# Patient Record
Sex: FEMALE | Race: WHITE | NOT HISPANIC OR LATINO
[De-identification: names, ages, dates, MRNs, and addresses within clinical notes are randomized per-mention and may not be internally consistent; named-entity substitution may affect disease eponyms.]

---

## 2019-01-09 ENCOUNTER — TRANSCRIPTION ENCOUNTER (OUTPATIENT)
Age: 59
End: 2019-01-09

## 2019-01-24 ENCOUNTER — TRANSCRIPTION ENCOUNTER (OUTPATIENT)
Age: 59
End: 2019-01-24

## 2019-01-24 ENCOUNTER — NON-APPOINTMENT (OUTPATIENT)
Age: 59
End: 2019-01-24

## 2019-01-24 ENCOUNTER — APPOINTMENT (OUTPATIENT)
Dept: INTERNAL MEDICINE | Facility: CLINIC | Age: 59
End: 2019-01-24
Payer: COMMERCIAL

## 2019-01-24 VITALS
HEIGHT: 68.75 IN | DIASTOLIC BLOOD PRESSURE: 90 MMHG | TEMPERATURE: 97.9 F | HEART RATE: 71 BPM | SYSTOLIC BLOOD PRESSURE: 140 MMHG | WEIGHT: 193 LBS | BODY MASS INDEX: 28.58 KG/M2 | OXYGEN SATURATION: 98 %

## 2019-01-24 DIAGNOSIS — L98.9 DISORDER OF THE SKIN AND SUBCUTANEOUS TISSUE, UNSPECIFIED: ICD-10-CM

## 2019-01-24 DIAGNOSIS — Z82.3 FAMILY HISTORY OF STROKE: ICD-10-CM

## 2019-01-24 DIAGNOSIS — Z78.9 OTHER SPECIFIED HEALTH STATUS: ICD-10-CM

## 2019-01-24 DIAGNOSIS — Z82.49 FAMILY HISTORY OF ISCHEMIC HEART DISEASE AND OTHER DISEASES OF THE CIRCULATORY SYSTEM: ICD-10-CM

## 2019-01-24 PROCEDURE — 93000 ELECTROCARDIOGRAM COMPLETE: CPT

## 2019-01-24 PROCEDURE — 99204 OFFICE O/P NEW MOD 45 MIN: CPT | Mod: 25

## 2019-01-24 PROCEDURE — 99386 PREV VISIT NEW AGE 40-64: CPT | Mod: 25

## 2019-01-24 NOTE — HEALTH RISK ASSESSMENT
[Good] : ~his/her~  mood as  good [Intercurrent Urgi Care visits] : went to urgent care [No falls in past year] : Patient reported no falls in the past year [0] : 2) Feeling down, depressed, or hopeless: Not at all (0) [With Family] : lives with family [# of Members in Household ___] :  household currently consist of [unfilled] member(s) [Employed] : employed [Graduate School] : graduate school [Single] : single [Feels Safe at Home] : Feels safe at home [Fully functional (bathing, dressing, toileting, transferring, walking, feeding)] : Fully functional (bathing, dressing, toileting, transferring, walking, feeding) [Fully functional (using the telephone, shopping, preparing meals, housekeeping, doing laundry, using] : Fully functional and needs no help or supervision to perform IADLs (using the telephone, shopping, preparing meals, housekeeping, doing laundry, using transportation, managing medications and managing finances) [Reports changes in vision] : Reports changes in vision [Smoke Detector] : smoke detector [Carbon Monoxide Detector] : carbon monoxide detector [Seat Belt] :  uses seat belt [Sunscreen] : uses sunscreen [Designated Healthcare Proxy] : Designated healthcare proxy [Name: ___] : Health Care Proxy's Name: [unfilled]  [Relationship: ___] : Relationship: [unfilled] [] : No [de-identified] : rarely [Change in mental status noted] : No change in mental status noted [Language] : denies difficulty with language [Behavior] : denies difficulty with behavior [Handling Complex Tasks] : denies difficulty handling complex tasks [Reasoning] : denies difficulty with reasoning [Reports changes in hearing] : Reports no changes in hearing [Reports changes in dental health] : Reports no changes in dental health [MammogramDate] : several years [PapSmearDate] : many years [BoneDensityDate] : never [ColonoscopyDate] : 8 years ago  [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

## 2019-01-24 NOTE — PHYSICAL EXAM
[No Acute Distress] : no acute distress [Well Nourished] : well nourished [Well Developed] : well developed [Well-Appearing] : well-appearing [Normal Sclera/Conjunctiva] : normal sclera/conjunctiva [PERRL] : pupils equal round and reactive to light [EOMI] : extraocular movements intact [Normal Outer Ear/Nose] : the outer ears and nose were normal in appearance [Normal Oropharynx] : the oropharynx was normal [Normal TMs] : both tympanic membranes were normal [No JVD] : no jugular venous distention [Supple] : supple [No Lymphadenopathy] : no lymphadenopathy [Thyroid Normal, No Nodules] : the thyroid was normal and there were no nodules present [No Respiratory Distress] : no respiratory distress  [Clear to Auscultation] : lungs were clear to auscultation bilaterally [No Accessory Muscle Use] : no accessory muscle use [Normal Rate] : normal rate  [Regular Rhythm] : with a regular rhythm [Normal S1, S2] : normal S1 and S2 [No Murmur] : no murmur heard [No Carotid Bruits] : no carotid bruits [No Abdominal Bruit] : a ~M bruit was not heard ~T in the abdomen [No Varicosities] : no varicosities [Pedal Pulses Present] : the pedal pulses are present [No Edema] : there was no peripheral edema [No Extremity Clubbing/Cyanosis] : no extremity clubbing/cyanosis [No Palpable Aorta] : no palpable aorta [Normal Appearance] : normal in appearance [No Nipple Discharge] : no nipple discharge [No Axillary Lymphadenopathy] : no axillary lymphadenopathy [Soft] : abdomen soft [Non Tender] : non-tender [Non-distended] : non-distended [No Masses] : no abdominal mass palpated [No HSM] : no HSM [Normal Bowel Sounds] : normal bowel sounds [Normal Sphincter Tone] : normal sphincter tone [No Mass] : no mass [Normal Posterior Cervical Nodes] : no posterior cervical lymphadenopathy [Normal Anterior Cervical Nodes] : no anterior cervical lymphadenopathy [No Joint Swelling] : no joint swelling [Grossly Normal Strength/Tone] : grossly normal strength/tone [No Rash] : no rash [Normal Gait] : normal gait [Coordination Grossly Intact] : coordination grossly intact [No Focal Deficits] : no focal deficits [Normal Affect] : the affect was normal [Normal Insight/Judgement] : insight and judgment were intact [Stool Occult Blood] : stool negative for occult blood

## 2019-01-24 NOTE — HISTORY OF PRESENT ILLNESS
[FreeTextEntry1] : The patient is a 58 year year old female who presents for annual physical examination.\par  [de-identified] : The patient is a 58-year-old female with no significant past medical history presents for routine physical exam and to establish care. Patient reports that she had been seeing a dermatologist in [place] for a lesion on her right cheek which was never fully removed. She is requesting the name of a dermatologist for further management. She has otherwise been feeling well. She denies any symptoms of chest pain, shortness of breath, palpitations, dizziness, lightheadedness, or syncope. Patient's appetite is good and there have been no symptoms of nausea, vomiting, change in bowels, bright red blood per rectum, or melena.  She denies urinary frequency, urgency, dysuria, or hematuria. She admits that she has not been exercising as regularly in recent months.\par

## 2019-01-24 NOTE — ASSESSMENT
[FreeTextEntry1] : Healthcare maintenance: Patient is overdue for mammogram and bone density and was provided with a prescription for both of these. She was also advised to schedule appointment with gastroenterologist for screening colonoscopy. Patient also provided with referral to Gynecologist for routine exam.

## 2019-02-04 ENCOUNTER — LABORATORY RESULT (OUTPATIENT)
Age: 59
End: 2019-02-04

## 2019-02-05 ENCOUNTER — FORM ENCOUNTER (OUTPATIENT)
Age: 59
End: 2019-02-05

## 2019-02-06 ENCOUNTER — OUTPATIENT (OUTPATIENT)
Dept: OUTPATIENT SERVICES | Facility: HOSPITAL | Age: 59
LOS: 1 days | End: 2019-02-06
Payer: COMMERCIAL

## 2019-02-06 ENCOUNTER — APPOINTMENT (OUTPATIENT)
Dept: RADIOLOGY | Facility: CLINIC | Age: 59
End: 2019-02-06
Payer: COMMERCIAL

## 2019-02-06 ENCOUNTER — APPOINTMENT (OUTPATIENT)
Dept: MAMMOGRAPHY | Facility: CLINIC | Age: 59
End: 2019-02-06
Payer: COMMERCIAL

## 2019-02-06 DIAGNOSIS — Z12.31 ENCOUNTER FOR SCREENING MAMMOGRAM FOR MALIGNANT NEOPLASM OF BREAST: ICD-10-CM

## 2019-02-06 PROCEDURE — 77080 DXA BONE DENSITY AXIAL: CPT | Mod: 26

## 2019-02-06 PROCEDURE — 77067 SCR MAMMO BI INCL CAD: CPT | Mod: 26

## 2019-02-06 PROCEDURE — 77067 SCR MAMMO BI INCL CAD: CPT

## 2019-02-06 PROCEDURE — 77080 DXA BONE DENSITY AXIAL: CPT

## 2019-02-06 PROCEDURE — 77063 BREAST TOMOSYNTHESIS BI: CPT | Mod: 26

## 2019-02-06 PROCEDURE — 77063 BREAST TOMOSYNTHESIS BI: CPT

## 2019-02-07 ENCOUNTER — LABORATORY RESULT (OUTPATIENT)
Age: 59
End: 2019-02-07

## 2019-02-09 ENCOUNTER — LABORATORY RESULT (OUTPATIENT)
Age: 59
End: 2019-02-09

## 2019-02-13 ENCOUNTER — TRANSCRIPTION ENCOUNTER (OUTPATIENT)
Age: 59
End: 2019-02-13

## 2019-02-13 ENCOUNTER — APPOINTMENT (OUTPATIENT)
Dept: DERMATOLOGY | Facility: CLINIC | Age: 59
End: 2019-02-13

## 2019-02-14 LAB
APPEARANCE: ABNORMAL
BILIRUBIN URINE: NEGATIVE
BLOOD URINE: ABNORMAL
COLOR: YELLOW
GLUCOSE QUALITATIVE U: NEGATIVE MG/DL
KETONES URINE: NEGATIVE
LEUKOCYTE ESTERASE URINE: NEGATIVE
NITRITE URINE: NEGATIVE
PH URINE: 5.5
PROTEIN URINE: ABNORMAL MG/DL
SPECIFIC GRAVITY URINE: 1.03
UROBILINOGEN URINE: NEGATIVE MG/DL

## 2019-02-19 ENCOUNTER — FORM ENCOUNTER (OUTPATIENT)
Age: 59
End: 2019-02-19

## 2019-02-20 ENCOUNTER — OUTPATIENT (OUTPATIENT)
Dept: OUTPATIENT SERVICES | Facility: HOSPITAL | Age: 59
LOS: 1 days | End: 2019-02-20
Payer: COMMERCIAL

## 2019-02-20 ENCOUNTER — APPOINTMENT (OUTPATIENT)
Dept: ULTRASOUND IMAGING | Facility: CLINIC | Age: 59
End: 2019-02-20
Payer: COMMERCIAL

## 2019-02-20 DIAGNOSIS — Z00.8 ENCOUNTER FOR OTHER GENERAL EXAMINATION: ICD-10-CM

## 2019-02-20 PROCEDURE — 76770 US EXAM ABDO BACK WALL COMP: CPT | Mod: 26

## 2019-02-20 PROCEDURE — 76770 US EXAM ABDO BACK WALL COMP: CPT

## 2019-02-22 ENCOUNTER — APPOINTMENT (OUTPATIENT)
Dept: DERMATOLOGY | Facility: CLINIC | Age: 59
End: 2019-02-22
Payer: COMMERCIAL

## 2019-02-22 DIAGNOSIS — L81.4 OTHER MELANIN HYPERPIGMENTATION: ICD-10-CM

## 2019-02-22 DIAGNOSIS — L72.0 EPIDERMAL CYST: ICD-10-CM

## 2019-02-22 DIAGNOSIS — H54.7 UNSPECIFIED VISUAL LOSS: ICD-10-CM

## 2019-02-22 DIAGNOSIS — Z91.89 OTHER SPECIFIED PERSONAL RISK FACTORS, NOT ELSEWHERE CLASSIFIED: ICD-10-CM

## 2019-02-22 DIAGNOSIS — Z87.2 PERSONAL HISTORY OF DISEASES OF THE SKIN AND SUBCUTANEOUS TISSUE: ICD-10-CM

## 2019-02-22 DIAGNOSIS — Z84.0 FAMILY HISTORY OF DISEASES OF THE SKIN AND SUBCUTANEOUS TISSUE: ICD-10-CM

## 2019-02-22 PROCEDURE — 99202 OFFICE O/P NEW SF 15 MIN: CPT

## 2019-03-01 ENCOUNTER — TRANSCRIPTION ENCOUNTER (OUTPATIENT)
Age: 59
End: 2019-03-01

## 2019-04-11 ENCOUNTER — FORM ENCOUNTER (OUTPATIENT)
Age: 59
End: 2019-04-11

## 2019-04-12 ENCOUNTER — APPOINTMENT (OUTPATIENT)
Dept: MAMMOGRAPHY | Facility: CLINIC | Age: 59
End: 2019-04-12
Payer: COMMERCIAL

## 2019-04-12 ENCOUNTER — OUTPATIENT (OUTPATIENT)
Dept: OUTPATIENT SERVICES | Facility: HOSPITAL | Age: 59
LOS: 1 days | End: 2019-04-12
Payer: COMMERCIAL

## 2019-04-12 DIAGNOSIS — Z00.8 ENCOUNTER FOR OTHER GENERAL EXAMINATION: ICD-10-CM

## 2019-04-12 PROCEDURE — 77065 DX MAMMO INCL CAD UNI: CPT | Mod: 26,LT

## 2019-04-12 PROCEDURE — 77065 DX MAMMO INCL CAD UNI: CPT

## 2019-04-28 ENCOUNTER — TRANSCRIPTION ENCOUNTER (OUTPATIENT)
Age: 59
End: 2019-04-28

## 2019-11-13 ENCOUNTER — APPOINTMENT (OUTPATIENT)
Dept: MAMMOGRAPHY | Facility: CLINIC | Age: 59
End: 2019-11-13

## 2019-12-18 ENCOUNTER — FORM ENCOUNTER (OUTPATIENT)
Age: 59
End: 2019-12-18

## 2019-12-19 ENCOUNTER — OUTPATIENT (OUTPATIENT)
Dept: OUTPATIENT SERVICES | Facility: HOSPITAL | Age: 59
LOS: 1 days | End: 2019-12-19
Payer: COMMERCIAL

## 2019-12-19 ENCOUNTER — APPOINTMENT (OUTPATIENT)
Dept: MAMMOGRAPHY | Facility: CLINIC | Age: 59
End: 2019-12-19
Payer: COMMERCIAL

## 2019-12-19 DIAGNOSIS — Z00.8 ENCOUNTER FOR OTHER GENERAL EXAMINATION: ICD-10-CM

## 2019-12-19 PROCEDURE — 77065 DX MAMMO INCL CAD UNI: CPT

## 2019-12-19 PROCEDURE — 77065 DX MAMMO INCL CAD UNI: CPT | Mod: 26,LT

## 2019-12-26 ENCOUNTER — APPOINTMENT (OUTPATIENT)
Dept: INTERNAL MEDICINE | Facility: CLINIC | Age: 59
End: 2019-12-26
Payer: COMMERCIAL

## 2019-12-26 ENCOUNTER — LABORATORY RESULT (OUTPATIENT)
Age: 59
End: 2019-12-26

## 2019-12-26 VITALS
SYSTOLIC BLOOD PRESSURE: 140 MMHG | TEMPERATURE: 98.3 F | WEIGHT: 193 LBS | HEART RATE: 68 BPM | OXYGEN SATURATION: 98 % | BODY MASS INDEX: 29.25 KG/M2 | DIASTOLIC BLOOD PRESSURE: 90 MMHG | HEIGHT: 68 IN

## 2019-12-26 PROCEDURE — G0008: CPT

## 2019-12-26 PROCEDURE — 99213 OFFICE O/P EST LOW 20 MIN: CPT | Mod: 25

## 2019-12-26 PROCEDURE — 90686 IIV4 VACC NO PRSV 0.5 ML IM: CPT

## 2019-12-26 NOTE — PHYSICAL EXAM
[Normal Oropharynx] : the oropharynx was normal [Normal TMs] : both tympanic membranes were normal [Normal Nasal Mucosa] : the nasal mucosa was normal [No Lymphadenopathy] : no lymphadenopathy [Supple] : supple [No Edema] : there was no peripheral edema [Normal] : soft, non-tender, non-distended, no masses palpated, no HSM and normal bowel sounds [Normal Posterior Cervical Nodes] : no posterior cervical lymphadenopathy [Normal Anterior Cervical Nodes] : no anterior cervical lymphadenopathy [No Rash] : no rash [No Focal Deficits] : no focal deficits [Normal Affect] : the affect was normal [Alert and Oriented x3] : oriented to person, place, and time [Normal Insight/Judgement] : insight and judgment were intact

## 2019-12-26 NOTE — HISTORY OF PRESENT ILLNESS
[FreeTextEntry1] : The patient is a 59-year-old female who presents for follow-up. [de-identified] : The patient is a 59-year-old female with no significant past medical history presents for follow-up and evaluation of recurrent epistaxis.  Patient reports that she developed a bloody nose on Thanksgiving.  Since it was prolonged she presented to [facility] for evaluation.  Bleeding eventually stopped and she was prescribed Afrin to be used as needed.  Since that episode she has had 2 more episodes.  The last one being 4 days ago.  Denies recent trauma or headaches.  She has not had increased bruising, gum bleeding, hematuria, or bright red blood per rectum.  She admits that blood pressure upon presentation to the emergency department on Thanksgiving was significantly elevated.  However when she was discharged blood pressure was reasonably well controlled.  She does not measure blood pressure readings at home.  Patient was referred to ENT by emergency medicine physician however has not yet scheduled an appointment.  Patient denies using over-the-counter NSAIDs or aspirin on a regular basis.

## 2020-01-13 ENCOUNTER — TRANSCRIPTION ENCOUNTER (OUTPATIENT)
Age: 60
End: 2020-01-13

## 2020-01-17 ENCOUNTER — LABORATORY RESULT (OUTPATIENT)
Age: 60
End: 2020-01-17

## 2020-01-20 LAB
25(OH)D3 SERPL-MCNC: 36.9 NG/ML
ALBUMIN SERPL ELPH-MCNC: 4.5 G/DL
ALP BLD-CCNC: 108 U/L
ALT SERPL-CCNC: 16 U/L
ANION GAP SERPL CALC-SCNC: 12 MMOL/L
APPEARANCE: CLEAR
AST SERPL-CCNC: 20 U/L
BASOPHILS # BLD AUTO: 0.06 K/UL
BASOPHILS NFR BLD AUTO: 0.9 %
BILIRUB SERPL-MCNC: 0.5 MG/DL
BILIRUBIN URINE: NEGATIVE
BLOOD URINE: ABNORMAL
BUN SERPL-MCNC: 14 MG/DL
CALCIUM SERPL-MCNC: 9.2 MG/DL
CHLORIDE SERPL-SCNC: 105 MMOL/L
CHOLEST SERPL-MCNC: 135 MG/DL
CHOLEST/HDLC SERPL: 4.4 RATIO
CO2 SERPL-SCNC: 24 MMOL/L
COLOR: YELLOW
CREAT SERPL-MCNC: 0.78 MG/DL
EOSINOPHIL # BLD AUTO: 0.24 K/UL
EOSINOPHIL NFR BLD AUTO: 3.6 %
ESTIMATED AVERAGE GLUCOSE: 108 MG/DL
FOLATE SERPL-MCNC: 9.9 NG/ML
GLUCOSE QUALITATIVE U: NEGATIVE
GLUCOSE SERPL-MCNC: 108 MG/DL
HBA1C MFR BLD HPLC: 5.4 %
HCT VFR BLD CALC: 35.8 %
HDLC SERPL-MCNC: 30 MG/DL
HGB BLD-MCNC: 10.6 G/DL
IMM GRANULOCYTES NFR BLD AUTO: 0.9 %
KETONES URINE: NEGATIVE
LDLC SERPL CALC-MCNC: 82 MG/DL
LEUKOCYTE ESTERASE URINE: ABNORMAL
LYMPHOCYTES # BLD AUTO: 2.11 K/UL
LYMPHOCYTES NFR BLD AUTO: 31.4 %
MAN DIFF?: NORMAL
MCHC RBC-ENTMCNC: 19.1 PG
MCHC RBC-ENTMCNC: 29.6 GM/DL
MCV RBC AUTO: 64.6 FL
MONOCYTES # BLD AUTO: 0.49 K/UL
MONOCYTES NFR BLD AUTO: 7.3 %
NEUTROPHILS # BLD AUTO: 3.76 K/UL
NEUTROPHILS NFR BLD AUTO: 55.9 %
NITRITE URINE: NEGATIVE
PH URINE: 6
PLATELET # BLD AUTO: 285 K/UL
POTASSIUM SERPL-SCNC: 4.4 MMOL/L
PROT SERPL-MCNC: 6.7 G/DL
PROTEIN URINE: NORMAL
RBC # BLD: 5.54 M/UL
RBC # FLD: 15.9 %
SODIUM SERPL-SCNC: 140 MMOL/L
SPECIFIC GRAVITY URINE: 1.02
TRIGL SERPL-MCNC: 112 MG/DL
TSH SERPL-ACNC: 2.1 UIU/ML
UROBILINOGEN URINE: NORMAL
VIT B12 SERPL-MCNC: 439 PG/ML
WBC # FLD AUTO: 6.72 K/UL

## 2020-01-23 LAB
FERRITIN SERPL-MCNC: 223 NG/ML
IRON SATN MFR SERPL: 28 %
IRON SERPL-MCNC: 85 UG/DL
TIBC SERPL-MCNC: 302 UG/DL
UIBC SERPL-MCNC: 217 UG/DL

## 2020-01-30 ENCOUNTER — NON-APPOINTMENT (OUTPATIENT)
Age: 60
End: 2020-01-30

## 2020-01-30 ENCOUNTER — APPOINTMENT (OUTPATIENT)
Dept: INTERNAL MEDICINE | Facility: CLINIC | Age: 60
End: 2020-01-30
Payer: COMMERCIAL

## 2020-01-30 VITALS
BODY MASS INDEX: 28.95 KG/M2 | TEMPERATURE: 99.3 F | HEART RATE: 85 BPM | DIASTOLIC BLOOD PRESSURE: 90 MMHG | OXYGEN SATURATION: 98 % | WEIGHT: 191 LBS | HEIGHT: 68 IN | SYSTOLIC BLOOD PRESSURE: 168 MMHG

## 2020-01-30 VITALS — DIASTOLIC BLOOD PRESSURE: 82 MMHG | SYSTOLIC BLOOD PRESSURE: 144 MMHG

## 2020-01-30 DIAGNOSIS — D64.9 ANEMIA, UNSPECIFIED: ICD-10-CM

## 2020-01-30 DIAGNOSIS — Z87.898 PERSONAL HISTORY OF OTHER SPECIFIED CONDITIONS: ICD-10-CM

## 2020-01-30 DIAGNOSIS — R31.29 OTHER MICROSCOPIC HEMATURIA: ICD-10-CM

## 2020-01-30 DIAGNOSIS — R03.0 ELEVATED BLOOD-PRESSURE READING, W/OUT DIAGNOSIS OF HYPERTENSION: ICD-10-CM

## 2020-01-30 DIAGNOSIS — Z00.00 ENCOUNTER FOR GENERAL ADULT MEDICAL EXAMINATION W/OUT ABNORMAL FINDINGS: ICD-10-CM

## 2020-01-30 PROCEDURE — 93000 ELECTROCARDIOGRAM COMPLETE: CPT | Mod: 59

## 2020-01-30 PROCEDURE — G0444 DEPRESSION SCREEN ANNUAL: CPT

## 2020-01-30 PROCEDURE — 99396 PREV VISIT EST AGE 40-64: CPT | Mod: 25

## 2020-01-30 NOTE — HISTORY OF PRESENT ILLNESS
[FreeTextEntry1] : The patient is a 59 year  old female who presents for annual physical examination.\par  [de-identified] : The patient is a 59-year-old female with past medical history significant for borderline hypertension presents for routine physical exam.  Patient states that she developed a recent URI with recent travel to [place].  She continues to have a residual cough but denies fever, chills, body aches, nausea, or vomiting.  She has been taking over-the-counter medications including Coricidin with some relief. She denies any symptoms of chest pain, shortness of breath, palpitations, dizziness, lightheadedness, or syncope. Patient's appetite is good and there have been no symptoms of nausea, vomiting, change in bowels, bright red blood per rectum, or melena.  She denies urinary frequency, urgency, dysuria, or hematuria.  Patient has been trying to increase exercise routine in an attempt to lose weight.  She also admits that she has been periodically checking her blood pressure readings at a local pharmacy and blood pressure readings have remained in the 140s systolic range.\par

## 2020-01-30 NOTE — PHYSICAL EXAM
[No Acute Distress] : no acute distress [Well Nourished] : well nourished [Well Developed] : well developed [Well-Appearing] : well-appearing [Normal Sclera/Conjunctiva] : normal sclera/conjunctiva [PERRL] : pupils equal round and reactive to light [EOMI] : extraocular movements intact [Normal Outer Ear/Nose] : the outer ears and nose were normal in appearance [Normal Oropharynx] : the oropharynx was normal [Normal TMs] : both tympanic membranes were normal [No JVD] : no jugular venous distention [No Lymphadenopathy] : no lymphadenopathy [Supple] : supple [Thyroid Normal, No Nodules] : the thyroid was normal and there were no nodules present [No Respiratory Distress] : no respiratory distress  [No Accessory Muscle Use] : no accessory muscle use [Clear to Auscultation] : lungs were clear to auscultation bilaterally [Normal Rate] : normal rate  [Regular Rhythm] : with a regular rhythm [Normal S1, S2] : normal S1 and S2 [No Murmur] : no murmur heard [No Carotid Bruits] : no carotid bruits [No Varicosities] : no varicosities [Pedal Pulses Present] : the pedal pulses are present [No Edema] : there was no peripheral edema [No Extremity Clubbing/Cyanosis] : no extremity clubbing/cyanosis [Normal Appearance] : normal in appearance [No Nipple Discharge] : no nipple discharge [No Axillary Lymphadenopathy] : no axillary lymphadenopathy [Soft] : abdomen soft [Non Tender] : non-tender [Non-distended] : non-distended [No Masses] : no abdominal mass palpated [No HSM] : no HSM [Normal Bowel Sounds] : normal bowel sounds [Normal Sphincter Tone] : normal sphincter tone [No Mass] : no mass [Normal Posterior Cervical Nodes] : no posterior cervical lymphadenopathy [Normal Anterior Cervical Nodes] : no anterior cervical lymphadenopathy [No Joint Swelling] : no joint swelling [Grossly Normal Strength/Tone] : grossly normal strength/tone [No Rash] : no rash [Coordination Grossly Intact] : coordination grossly intact [No Focal Deficits] : no focal deficits [Normal Gait] : normal gait [Normal Affect] : the affect was normal [Alert and Oriented x3] : oriented to person, place, and time [Normal Insight/Judgement] : insight and judgment were intact [Stool Occult Blood] : stool negative for occult blood

## 2020-01-30 NOTE — HEALTH RISK ASSESSMENT
[Good] : ~his/her~  mood as  good [No falls in past year] : Patient reported no falls in the past year [No] : No [0] : 1) Little interest or pleasure doing things: Not at all (0) [(PHQ-2) Unable to screen] : PHQ-2: unable to screen [Single] : single [Employed] : employed [Graduate School] : graduate school [Feels Safe at Home] : Feels safe at home [Fully functional (bathing, dressing, toileting, transferring, walking, feeding)] : Fully functional (bathing, dressing, toileting, transferring, walking, feeding) [Fully functional (using the telephone, shopping, preparing meals, housekeeping, doing laundry, using] : Fully functional and needs no help or supervision to perform IADLs (using the telephone, shopping, preparing meals, housekeeping, doing laundry, using transportation, managing medications and managing finances) [Carbon Monoxide Detector] : carbon monoxide detector [Smoke Detector] : smoke detector [Sunscreen] : uses sunscreen [Seat Belt] :  uses seat belt [Name: ___] : Health Care Proxy's Name: [unfilled]  [Designated Healthcare Proxy] : Designated healthcare proxy [Relationship: ___] : Relationship: [unfilled] [] : No [de-identified] : dermatology-  Dr. Miranda   Gynecology-  [de-identified] : walks regularly [Change in mental status noted] : No change in mental status noted [Language] : denies difficulty with language [Behavior] : denies difficulty with behavior [Handling Complex Tasks] : denies difficulty handling complex tasks [Reasoning] : denies difficulty with reasoning [Reports changes in hearing] : Reports no changes in hearing [Reports changes in vision] : Reports no changes in vision [MammogramDate] : 02/19 [PapSmearDate] : many years [BoneDensityDate] : 02/19 [ColonoscopyDate] : 2015 [de-identified] : wears glasses

## 2020-02-19 ENCOUNTER — APPOINTMENT (OUTPATIENT)
Dept: OTOLARYNGOLOGY | Facility: CLINIC | Age: 60
End: 2020-02-19
Payer: COMMERCIAL

## 2020-02-19 VITALS
HEART RATE: 64 BPM | HEIGHT: 68 IN | SYSTOLIC BLOOD PRESSURE: 139 MMHG | BODY MASS INDEX: 28.79 KG/M2 | DIASTOLIC BLOOD PRESSURE: 86 MMHG | WEIGHT: 190 LBS

## 2020-02-19 PROCEDURE — 31231 NASAL ENDOSCOPY DX: CPT

## 2020-02-19 PROCEDURE — 99242 OFF/OP CONSLTJ NEW/EST SF 20: CPT | Mod: 25

## 2020-02-19 NOTE — HISTORY OF PRESENT ILLNESS
[de-identified] : 59F presents for evaluation of nosebleeds. Referred by Dr. Josee Poole (PCP). Denies history of sinonasal issues, nosebleeds. The day of thanksgiving she suffered a right-sided nosebleed that she was unable to stop on her own-- presented to [facility] ED where they stopped it with pressure and afrin. Since then, has had several minor episodes, usually bilateral in nature. All have stopped with pressure alone. Reports mild nasal congestion, facial pain/pressure which is sensitive to changes in the weather.. Discolored rhinorrhea when sick-- most recently in January.  Infrequent tho-- approximately 1x/day. Fairly good sense of smell.\par \par SNOT-22: 9\par PMH: borderline htn

## 2020-02-19 NOTE — CONSULT LETTER
[Dear  ___] : Dear  [unfilled], [Consult Letter:] : I had the pleasure of evaluating your patient, [unfilled]. [Please see my note below.] : Please see my note below. [Consult Closing:] : Thank you very much for allowing me to participate in the care of this patient.  If you have any questions, please do not hesitate to contact me. [Sincerely,] : Sincerely, [FreeTextEntry3] : Donny Nicholas MD\par Department of Otolaryngology- Head & Neck Surgery\par [facility]\par [facility]\par \par Phone: [phone]\par Fax: [phone]\par

## 2020-03-05 ENCOUNTER — APPOINTMENT (OUTPATIENT)
Dept: INTERNAL MEDICINE | Facility: CLINIC | Age: 60
End: 2020-03-05
Payer: COMMERCIAL

## 2020-03-05 VITALS
HEIGHT: 68 IN | OXYGEN SATURATION: 97 % | SYSTOLIC BLOOD PRESSURE: 160 MMHG | BODY MASS INDEX: 28.95 KG/M2 | DIASTOLIC BLOOD PRESSURE: 90 MMHG | HEART RATE: 71 BPM | TEMPERATURE: 97.7 F | WEIGHT: 191 LBS

## 2020-03-05 VITALS — SYSTOLIC BLOOD PRESSURE: 142 MMHG | DIASTOLIC BLOOD PRESSURE: 90 MMHG

## 2020-03-05 PROCEDURE — 99213 OFFICE O/P EST LOW 20 MIN: CPT

## 2020-03-05 NOTE — HISTORY OF PRESENT ILLNESS
[FreeTextEntry1] : The patient is a 59-year-old female who presents for blood pressure check. [de-identified] : The patient is a 59-year-old female with past medical history significant for hypertension, recurrent epistaxis, presents for blood pressure check.  She was started on amlodipine 2.5 mg at her last visit tolerating the medication well.  Patient admits that she has been exercising more regularly and walks 2-1/2 to 5 miles daily.  She has not been monitoring her blood pressure readings at home but has remained compliant with low-sodium diet.  Patient reports that she had recurrent nosebleed approximately 1 week ago.  She was seen by ENT on February 19, 2020.  She did not require cauterization during that visit.  Patient denies headaches, chest pain, shortness of breath, dizziness, lightheadedness, or syncope.

## 2020-03-05 NOTE — PHYSICAL EXAM
[Normal Sclera/Conjunctiva] : normal sclera/conjunctiva [EOMI] : extraocular movements intact [Normal] : soft, non-tender, non-distended, no masses palpated, no HSM and normal bowel sounds [No Rash] : no rash [No Focal Deficits] : no focal deficits [Normal Affect] : the affect was normal [Alert and Oriented x3] : oriented to person, place, and time [Normal Insight/Judgement] : insight and judgment were intact

## 2020-03-27 ENCOUNTER — APPOINTMENT (OUTPATIENT)
Dept: GASTROENTEROLOGY | Facility: CLINIC | Age: 60
End: 2020-03-27

## 2020-05-07 ENCOUNTER — APPOINTMENT (OUTPATIENT)
Dept: INTERNAL MEDICINE | Facility: CLINIC | Age: 60
End: 2020-05-07
Payer: COMMERCIAL

## 2020-05-07 VITALS
WEIGHT: 186 LBS | BODY MASS INDEX: 28.19 KG/M2 | DIASTOLIC BLOOD PRESSURE: 78 MMHG | HEART RATE: 70 BPM | HEIGHT: 68 IN | OXYGEN SATURATION: 98 % | SYSTOLIC BLOOD PRESSURE: 120 MMHG | TEMPERATURE: 99 F

## 2020-05-07 VITALS — DIASTOLIC BLOOD PRESSURE: 78 MMHG | SYSTOLIC BLOOD PRESSURE: 126 MMHG

## 2020-05-07 DIAGNOSIS — I10 ESSENTIAL (PRIMARY) HYPERTENSION: ICD-10-CM

## 2020-05-07 DIAGNOSIS — R92.1 MAMMOGRAPHIC CALCIFICATION FOUND ON DIAGNOSTIC IMAGING OF BREAST: ICD-10-CM

## 2020-05-07 PROCEDURE — 99213 OFFICE O/P EST LOW 20 MIN: CPT

## 2020-05-07 RX ORDER — AMLODIPINE BESYLATE 2.5 MG/1
2.5 TABLET ORAL DAILY
Qty: 90 | Refills: 1 | Status: DISCONTINUED | COMMUNITY
Start: 2020-01-30 | End: 2020-05-07

## 2020-05-07 NOTE — HISTORY OF PRESENT ILLNESS
[FreeTextEntry1] : The patient is a 59-year-old female who presents for follow-up and blood pressure check. [de-identified] : The patient is a 59-year-old female with past medical history significant for hypertension presents for follow-up.  Patient purchased a wrist blood pressure monitor which she has been using regularly at home.  Her blood pressure readings are noted to be persistently elevated and ranged from 120s to 160 over 80s to 90s.  She admits that she no longer has had any further nosebleeds.  She denies headaches, chest pain, shortness of breath, dizziness, palpitations, or syncope.  She is tolerating higher dose of amlodipine without difficulty.  She has been exercising by walking 3 and half to 7 miles daily.  She also has successfully lost 5 pounds since her last visit.  She reports feeling better than she has in many months.

## 2020-05-07 NOTE — REVIEW OF SYSTEMS
[Recent Change In Weight] : ~T recent weight change [Negative] : Heme/Lymph [Skin Rash] : no skin rash [FreeTextEntry2] : Intentional 5 pound weight loss

## 2020-05-13 ENCOUNTER — APPOINTMENT (OUTPATIENT)
Dept: OTOLARYNGOLOGY | Facility: CLINIC | Age: 60
End: 2020-05-13
Payer: COMMERCIAL

## 2020-05-13 VITALS — SYSTOLIC BLOOD PRESSURE: 132 MMHG | TEMPERATURE: 97.8 F | DIASTOLIC BLOOD PRESSURE: 80 MMHG | HEART RATE: 65 BPM

## 2020-05-13 VITALS — BODY MASS INDEX: 28.34 KG/M2 | HEIGHT: 68 IN | WEIGHT: 187 LBS

## 2020-05-13 DIAGNOSIS — J35.2 HYPERTROPHY OF ADENOIDS: ICD-10-CM

## 2020-05-13 DIAGNOSIS — R04.0 EPISTAXIS: ICD-10-CM

## 2020-05-13 PROCEDURE — 99213 OFFICE O/P EST LOW 20 MIN: CPT | Mod: 25

## 2020-05-13 PROCEDURE — 31231 NASAL ENDOSCOPY DX: CPT

## 2020-05-13 NOTE — HISTORY OF PRESENT ILLNESS
[de-identified] : 59F presents for follow-up re: epistaxis\par LCV 2/19 at which time small area of dried blood along R caudal septum, no cauterization necessary\par Also was noted to have adenoid hypertrophy with central outpouching\par Since then, no issues w epistaxis, has not needed to use ointment or nasal sprays\par Denies all other symptoms

## 2020-05-13 NOTE — REASON FOR VISIT
[Subsequent Evaluation] : a subsequent evaluation for [Other: _____] : [unfilled] [FreeTextEntry2] : nosebleeds

## 2020-05-28 ENCOUNTER — RX RENEWAL (OUTPATIENT)
Age: 60
End: 2020-05-28

## 2020-05-28 RX ORDER — AMLODIPINE BESYLATE 5 MG/1
5 TABLET ORAL
Qty: 90 | Refills: 2 | Status: ACTIVE | COMMUNITY
Start: 2020-03-05 | End: 1900-01-01

## 2020-07-02 ENCOUNTER — APPOINTMENT (OUTPATIENT)
Dept: GASTROENTEROLOGY | Facility: CLINIC | Age: 60
End: 2020-07-02
Payer: COMMERCIAL

## 2020-07-02 VITALS
TEMPERATURE: 98 F | SYSTOLIC BLOOD PRESSURE: 140 MMHG | HEIGHT: 68 IN | BODY MASS INDEX: 27.28 KG/M2 | HEART RATE: 63 BPM | OXYGEN SATURATION: 98 % | WEIGHT: 180 LBS | DIASTOLIC BLOOD PRESSURE: 90 MMHG

## 2020-07-02 DIAGNOSIS — Z86.79 PERSONAL HISTORY OF OTHER DISEASES OF THE CIRCULATORY SYSTEM: ICD-10-CM

## 2020-07-02 DIAGNOSIS — K62.5 HEMORRHAGE OF ANUS AND RECTUM: ICD-10-CM

## 2020-07-02 DIAGNOSIS — Z12.11 ENCOUNTER FOR SCREENING FOR MALIGNANT NEOPLASM OF COLON: ICD-10-CM

## 2020-07-02 DIAGNOSIS — Z80.1 FAMILY HISTORY OF MALIGNANT NEOPLASM OF TRACHEA, BRONCHUS AND LUNG: ICD-10-CM

## 2020-07-02 DIAGNOSIS — Z80.51 FAMILY HISTORY OF MALIGNANT NEOPLASM OF KIDNEY: ICD-10-CM

## 2020-07-02 DIAGNOSIS — Z80.7 FAMILY HISTORY OF OTHER MALIGNANT NEOPLASMS OF LYMPHOID, HEMATOPOIETIC AND RELATED TISSUES: ICD-10-CM

## 2020-07-02 PROCEDURE — 99203 OFFICE O/P NEW LOW 30 MIN: CPT

## 2020-07-02 RX ORDER — SODIUM SULFATE, POTASSIUM SULFATE, MAGNESIUM SULFATE 17.5; 3.13; 1.6 G/ML; G/ML; G/ML
17.5-3.13-1.6 SOLUTION, CONCENTRATE ORAL
Qty: 1 | Refills: 0 | Status: ACTIVE | COMMUNITY
Start: 2020-07-02 | End: 1900-01-01

## 2020-07-02 RX ORDER — CALCIUM CITRATE/VITAMIN D3 315MG-6.25
TABLET ORAL
Refills: 0 | Status: ACTIVE | COMMUNITY

## 2020-07-02 NOTE — REASON FOR VISIT
[Initial Evaluation] : an initial evaluation [FreeTextEntry1] : Screening colonoscopy, rectal bleeding

## 2020-07-02 NOTE — ASSESSMENT
[FreeTextEntry1] : Patient in need of a screening colonoscopy.  She has occasional rectal bleeding.\par \par A colonoscopy has been scheduled. The risks, benefits, alternatives, and limitations of the procedure, including the possibility of missed lesions, were explained.

## 2020-07-02 NOTE — PHYSICAL EXAM
[General Appearance - In No Acute Distress] : in no acute distress [General Appearance - Alert] : alert [Neck Appearance] : the appearance of the neck was normal [Neck Cervical Mass (___cm)] : no neck mass was observed [Jugular Venous Distention Increased] : there was no jugular-venous distention [Thyroid Diffuse Enlargement] : the thyroid was not enlarged [Thyroid Nodule] : there were no palpable thyroid nodules [Auscultation Breath Sounds / Voice Sounds] : lungs were clear to auscultation bilaterally [Heart Sounds] : normal S1 and S2 [Heart Rate And Rhythm] : heart rate was normal and rhythm regular [Murmurs] : no murmurs [Heart Sounds Gallop] : no gallops [Heart Sounds Pericardial Friction Rub] : no pericardial rub [Bowel Sounds] : normal bowel sounds [Edema] : there was no peripheral edema [Abdomen Tenderness] : non-tender [Abdomen Soft] : soft [] : no hepato-splenomegaly [Abdomen Mass (___ Cm)] : no abdominal mass palpated [No CVA Tenderness] : no ~M costovertebral angle tenderness [No Spinal Tenderness] : no spinal tenderness [Oriented To Time, Place, And Person] : oriented to person, place, and time [Impaired Insight] : insight and judgment were intact [Affect] : the affect was normal

## 2020-07-02 NOTE — HISTORY OF PRESENT ILLNESS
[FreeTextEntry1] : The patient is a 59-year-old woman who is due for screening colonoscopy.  She denies abdominal pain, diarrhea, constipation.  She has 1 bowel movement every 1 to 2 days.  She does have occasional bright red blood on the toilet paper.  She denies melena, heartburn, dysphasia.  She has lost 11 pounds since the pandemic began intentionally.  The patient has not been admitted to the hospital in the past year and denies any cardiac issues.
actual/standing

## 2020-07-02 NOTE — CONSULT LETTER
[FreeTextEntry1] : Dear Dr. Josee Poole,\[facility] \[facility] I had the pleasure of seeing your patient ANDI ANDUJAR in the office today.  My office note is attached. PLEASE READ THE "ASSESSMENT" SECTION OF THE NOTE TO SEE MY IMPRESSION AND PLAN.\par \[facility] Thank you very much for allowing me to participate in the care of your patient.\[facility] \[facility] Sincerely,\[facility] \[facility] Ham العراقي M.D., FAC, [facility] Director, C[facility]ac Program at [facility]  of Medicine\[facility] [employer] [employer] [facility] Practice Director,\[facility] - Gastroenterology/Internal Medicine at [place]\[facility] [address]\[place], [place] [address] Tel: [phone]\[facility] Email: [email or web address] \[facility] \[facility] The attached note has been created using a voice recognition system (Dragon).  There may be some misspellings and typos.  Please call my office if you have any issues or questions.

## 2020-08-07 ENCOUNTER — APPOINTMENT (OUTPATIENT)
Dept: GASTROENTEROLOGY | Facility: AMBULATORY MEDICAL SERVICES | Age: 60
End: 2020-08-07
Payer: COMMERCIAL

## 2020-08-07 PROCEDURE — 45380 COLONOSCOPY AND BIOPSY: CPT

## 2020-11-12 ENCOUNTER — TRANSCRIPTION ENCOUNTER (OUTPATIENT)
Age: 60
End: 2020-11-12

## 2020-12-23 PROBLEM — Z12.11 ENCOUNTER FOR SCREENING FOR MALIGNANT NEOPLASM OF COLON: Status: RESOLVED | Noted: 2020-07-02 | Resolved: 2020-12-23

## 2021-10-06 PROBLEM — I10 ESSENTIAL HYPERTENSION: Status: ACTIVE | Noted: 2020-01-30

## 2022-01-13 ENCOUNTER — TRANSCRIPTION ENCOUNTER (OUTPATIENT)
Age: 62
End: 2022-01-13

## 2024-12-05 NOTE — PHYSICAL EXAM
[No Edema] : there was no peripheral edema [Normal] : no posterior cervical lymphadenopathy and no anterior cervical lymphadenopathy [No Rash] : no rash [No Focal Deficits] : no focal deficits [Normal Affect] : the affect was normal [Normal Insight/Judgement] : insight and judgment were intact [Alert and Oriented x3] : oriented to person, place, and time Patient